# Patient Record
Sex: MALE
[De-identification: names, ages, dates, MRNs, and addresses within clinical notes are randomized per-mention and may not be internally consistent; named-entity substitution may affect disease eponyms.]

---

## 2017-03-29 PROBLEM — Z00.00 ENCOUNTER FOR PREVENTIVE HEALTH EXAMINATION: Status: ACTIVE | Noted: 2017-03-29

## 2017-03-31 ENCOUNTER — APPOINTMENT (OUTPATIENT)
Dept: ORTHOPEDIC SURGERY | Facility: CLINIC | Age: 27
End: 2017-03-31

## 2017-03-31 VITALS
DIASTOLIC BLOOD PRESSURE: 89 MMHG | HEART RATE: 55 BPM | WEIGHT: 150 LBS | BODY MASS INDEX: 20.32 KG/M2 | HEIGHT: 72 IN | SYSTOLIC BLOOD PRESSURE: 147 MMHG

## 2017-03-31 DIAGNOSIS — Z78.9 OTHER SPECIFIED HEALTH STATUS: ICD-10-CM

## 2017-03-31 DIAGNOSIS — S62.326A DISPLACED FRACTURE OF SHAFT OF FIFTH METACARPAL BONE, RIGHT HAND, INITIAL ENCOUNTER FOR CLOSED FRACTURE: ICD-10-CM

## 2017-05-01 ENCOUNTER — APPOINTMENT (OUTPATIENT)
Dept: ORTHOPEDIC SURGERY | Facility: CLINIC | Age: 27
End: 2017-05-01

## 2017-05-01 VITALS
HEART RATE: 58 BPM | HEIGHT: 72 IN | WEIGHT: 150 LBS | BODY MASS INDEX: 20.32 KG/M2 | SYSTOLIC BLOOD PRESSURE: 149 MMHG | DIASTOLIC BLOOD PRESSURE: 84 MMHG

## 2017-05-01 DIAGNOSIS — S62.327D DISPLACED FRACTURE OF SHAFT OF FIFTH METACARPAL BONE, LEFT HAND, SUBSEQUENT ENCOUNTER FOR FRACTURE WITH ROUTINE HEALING: ICD-10-CM

## 2023-09-20 ENCOUNTER — APPOINTMENT (OUTPATIENT)
Dept: ORTHOPEDIC SURGERY | Facility: CLINIC | Age: 33
End: 2023-09-20

## 2023-09-21 ENCOUNTER — APPOINTMENT (OUTPATIENT)
Dept: ORTHOPEDIC SURGERY | Facility: CLINIC | Age: 33
End: 2023-09-21
Payer: COMMERCIAL

## 2023-09-21 PROCEDURE — 99204 OFFICE O/P NEW MOD 45 MIN: CPT

## 2023-09-21 PROCEDURE — 73030 X-RAY EXAM OF SHOULDER: CPT | Mod: RT

## 2023-09-21 PROCEDURE — 73130 X-RAY EXAM OF HAND: CPT | Mod: RT

## 2023-09-21 RX ORDER — CELECOXIB 200 MG/1
200 CAPSULE ORAL TWICE DAILY
Qty: 60 | Refills: 2 | Status: ACTIVE | COMMUNITY
Start: 2023-09-21 | End: 1900-01-01

## 2023-09-21 RX ORDER — DICLOFENAC SODIUM 1% 10 MG/G
1 GEL TOPICAL DAILY
Qty: 1 | Refills: 3 | Status: ACTIVE | COMMUNITY
Start: 2023-09-21 | End: 1900-01-01

## 2023-10-12 ENCOUNTER — APPOINTMENT (OUTPATIENT)
Dept: ORTHOPEDIC SURGERY | Facility: CLINIC | Age: 33
End: 2023-10-12
Payer: COMMERCIAL

## 2023-10-12 VITALS — WEIGHT: 150 LBS | BODY MASS INDEX: 20.32 KG/M2 | HEIGHT: 72 IN

## 2023-10-12 PROCEDURE — 99214 OFFICE O/P EST MOD 30 MIN: CPT

## 2023-11-15 ENCOUNTER — APPOINTMENT (OUTPATIENT)
Dept: MRI IMAGING | Facility: CLINIC | Age: 33
End: 2023-11-15
Payer: COMMERCIAL

## 2023-11-15 ENCOUNTER — APPOINTMENT (OUTPATIENT)
Dept: ORTHOPEDIC SURGERY | Facility: CLINIC | Age: 33
End: 2023-11-15
Payer: COMMERCIAL

## 2023-11-15 VITALS — HEIGHT: 72 IN | BODY MASS INDEX: 20.32 KG/M2 | WEIGHT: 150 LBS

## 2023-11-15 PROCEDURE — 73221 MRI JOINT UPR EXTREM W/O DYE: CPT | Mod: RT

## 2023-11-15 PROCEDURE — 99214 OFFICE O/P EST MOD 30 MIN: CPT

## 2023-11-22 ENCOUNTER — APPOINTMENT (OUTPATIENT)
Dept: ORTHOPEDIC SURGERY | Facility: CLINIC | Age: 33
End: 2023-11-22
Payer: COMMERCIAL

## 2023-11-22 VITALS — WEIGHT: 150 LBS | HEIGHT: 72 IN | BODY MASS INDEX: 20.32 KG/M2

## 2023-11-22 PROCEDURE — 20610 DRAIN/INJ JOINT/BURSA W/O US: CPT | Mod: RT

## 2023-11-22 PROCEDURE — 99214 OFFICE O/P EST MOD 30 MIN: CPT | Mod: 25

## 2023-11-22 PROCEDURE — J3490M: CUSTOM

## 2023-12-27 ENCOUNTER — APPOINTMENT (OUTPATIENT)
Dept: ORTHOPEDIC SURGERY | Facility: CLINIC | Age: 33
End: 2023-12-27
Payer: COMMERCIAL

## 2023-12-27 DIAGNOSIS — S46.011A STRAIN OF MUSCLE(S) AND TENDON(S) OF THE ROTATOR CUFF OF RIGHT SHOULDER, INITIAL ENCOUNTER: ICD-10-CM

## 2023-12-27 DIAGNOSIS — S60.221A CONTUSION OF RIGHT HAND, INITIAL ENCOUNTER: ICD-10-CM

## 2023-12-27 PROCEDURE — 99213 OFFICE O/P EST LOW 20 MIN: CPT
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
## 2023-12-27 NOTE — IMAGING
[de-identified] : right hand with no skin changes/bony deformity. no longer with TTP over interosseous region between 1st and 2nd MC regions. There is no longer ttp over the 5th MC shaft with no palpable deformity. All digits are nvi with FAROM.   and intrinsic strength is 5/5. Right wrist with full and pain free ROM with 5/5 strength noted. There is no ttp over this region. Tomas, TFCC and Finkelstein Tests are negative. Carpal Tunnel Tinel Sign is negative.  Right shoulder with no skin changes/bony deformity. minimal TTP over lateral subacromial region. Posterior Lift Off, and Hanson Gerard Impingement signs are mildly positive. 5/5 strength in all planes with exception of Posterior Lift Off 5-/5. There is no ligamentous laxity noted. Chew Test is negative. Spurling Signs are negative.

## 2023-12-27 NOTE — ASSESSMENT
[FreeTextEntry1] : The patient was advised of the diagnosis. The natural history of the pathology was explained in full to the patient in layman's terms. All questions were answered. The risks and benefits of surgical and non-surgical treatment alternatives were explained in full to the patient.   pt will continue PT x 6 weeks F/u in 4 weeks with shoulder specialist if symptoms have not resolved.
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
## 2023-12-27 NOTE — DATA REVIEWED
[FreeTextEntry1] :  2. subscapularis tendinopathy and fraying with moderate grade insertional tear 3. fraying and tear of the superior labrum and anterior inferior labrum with biceps tendinopathy and tenosynovitis 4. capsular thickening more noted anterior which can be seen as adhesive capsulitis 5. mild narrowing of the glenohumeral joint with joint effusion

## 2024-01-25 ENCOUNTER — APPOINTMENT (OUTPATIENT)
Dept: ORTHOPEDIC SURGERY | Facility: CLINIC | Age: 34
End: 2024-01-25
Payer: COMMERCIAL

## 2024-01-25 VITALS — BODY MASS INDEX: 20.32 KG/M2 | HEIGHT: 72 IN | WEIGHT: 150 LBS

## 2024-01-25 PROCEDURE — 99214 OFFICE O/P EST MOD 30 MIN: CPT

## 2024-02-15 ENCOUNTER — APPOINTMENT (OUTPATIENT)
Dept: ORTHOPEDIC SURGERY | Facility: CLINIC | Age: 34
End: 2024-02-15
Payer: COMMERCIAL

## 2024-02-15 VITALS — BODY MASS INDEX: 20.32 KG/M2 | HEIGHT: 72 IN | WEIGHT: 150 LBS

## 2024-02-15 PROCEDURE — 99214 OFFICE O/P EST MOD 30 MIN: CPT | Mod: 57

## 2024-02-15 PROCEDURE — L1833: CPT | Mod: RT

## 2024-02-15 NOTE — IMAGING
[de-identified] : No swelling, no ecchymosis, no feng deformity Tenderness to palpation over anterior shoulder No instability or tenderness over AC joint Full range of motion with pain 5/5 supraspinatus, infraspinatus and subscapularis; there is pain with strength testing Positive Mota No anterior or posterior shift, no sulcus sign Negative apprehension Motor and sensory intact distally
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
## 2024-02-15 NOTE — ASSESSMENT
[FreeTextEntry1] : Reviewed MR arthrogram in detail.  As I suspected that he does have a SLAP tear.  In addition, there is also an incomplete rotator cuff tear.  He has failed 5 months conservative treatment.  At this time I have recommended arthroscopic SLAP repair possible rotator cuff repair as well.  Postoperative course outlined.  UltraSling dispensed today in preparation for surgery  The risks and benefits of surgery have been discussed. Risks include but are not limited to bleeding, infection, reaction to anesthesia, injury to blood vessels and nerves, malunion, nonunion, DVT, PE, necessity of repeat surgery, chronic pain, loss of limb and death. The patient understands the risks and agrees with the surgical plan. All questions have been answered.

## 2024-03-07 ENCOUNTER — TRANSCRIPTION ENCOUNTER (OUTPATIENT)
Age: 34
End: 2024-03-07

## 2024-04-02 ENCOUNTER — APPOINTMENT (OUTPATIENT)
Age: 34
End: 2024-04-02
Payer: COMMERCIAL

## 2024-04-02 PROCEDURE — 29807 SHO ARTHRS SRG RPR SLAP LES: CPT | Mod: RT

## 2024-04-02 PROCEDURE — 29823 SHO ARTHRS SRG XTNSV DBRDMT: CPT | Mod: 59,RT

## 2024-04-02 PROCEDURE — 29823 SHO ARTHRS SRG XTNSV DBRDMT: CPT | Mod: AS,59,RT

## 2024-04-02 PROCEDURE — 29807 SHO ARTHRS SRG RPR SLAP LES: CPT | Mod: AS,RT

## 2024-04-02 RX ORDER — OXYCODONE AND ACETAMINOPHEN 5; 325 MG/1; MG/1
5-325 TABLET ORAL
Qty: 30 | Refills: 0 | Status: ACTIVE | COMMUNITY
Start: 2024-04-02 | End: 1900-01-01

## 2024-04-12 ENCOUNTER — APPOINTMENT (OUTPATIENT)
Dept: ORTHOPEDIC SURGERY | Facility: CLINIC | Age: 34
End: 2024-04-12
Payer: COMMERCIAL

## 2024-04-12 VITALS — WEIGHT: 150 LBS | BODY MASS INDEX: 20.32 KG/M2 | HEIGHT: 72 IN

## 2024-04-12 PROCEDURE — 99024 POSTOP FOLLOW-UP VISIT: CPT
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
## 2024-04-12 NOTE — IMAGING
[de-identified] : No erythema or warmth Clean and dry incisions, sutures in place Shoulder immobilizer in place Limited ROM compatible with recent surgery Motor and sensory intact distally

## 2024-04-12 NOTE — ASSESSMENT
[FreeTextEntry1] : DOING WELL DURING THIS FIRST POSTOP VISIT SUTURES REMOVED PRECAUTIONS AND RESTRICTIONS REVIEWED IN DETAIL WE REVIEWED THE INTRAOPERATIVE FINDINGS IN DETAIL (INCLUDING SCOPE PICTURES WHERE APPLICABLE) ALL QUESTIONS ANSWERED START PT

## 2024-05-10 ENCOUNTER — APPOINTMENT (OUTPATIENT)
Dept: ORTHOPEDIC SURGERY | Facility: CLINIC | Age: 34
End: 2024-05-10
Payer: COMMERCIAL

## 2024-05-10 VITALS — HEIGHT: 72 IN | WEIGHT: 150 LBS | BODY MASS INDEX: 20.32 KG/M2

## 2024-05-10 PROCEDURE — 99024 POSTOP FOLLOW-UP VISIT: CPT

## 2024-05-10 NOTE — ASSESSMENT
[FreeTextEntry1] : Progressing well after SLAP repair. Additional PT advised. Precautions and restrictions reviewed.  Follow-up in 6 weeks to reassess

## 2024-05-10 NOTE — IMAGING
[de-identified] : Portals healed Forward flexion 170 external rotation 80 internal rotation 60 Neurovascular intact distally
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
## 2024-06-21 ENCOUNTER — APPOINTMENT (OUTPATIENT)
Dept: ORTHOPEDIC SURGERY | Facility: CLINIC | Age: 34
End: 2024-06-21
Payer: SELF-PAY

## 2024-06-21 DIAGNOSIS — M75.111 INCOMPLETE ROTATOR CUFF TEAR OR RUPTURE OF RIGHT SHOULDER, NOT SPECIFIED AS TRAUMATIC: ICD-10-CM

## 2024-06-21 DIAGNOSIS — S43.431D SUPERIOR GLENOID LABRUM LESION OF RIGHT SHOULDER, SUBSEQUENT ENCOUNTER: ICD-10-CM

## 2024-06-21 DIAGNOSIS — M75.01 ADHESIVE CAPSULITIS OF RIGHT SHOULDER: ICD-10-CM

## 2024-06-21 PROCEDURE — 99024 POSTOP FOLLOW-UP VISIT: CPT

## 2024-06-21 NOTE — IMAGING
[de-identified] : Portals healed  Forward flexion 180 external rotation 90 internal rotation 65 5/5 Ss and IS Neurovascular intact distally

## 2024-06-21 NOTE — ASSESSMENT
[FreeTextEntry1] : Progressing well after SLAP repair.,  Now 2-1/2 months post. Patient doing well with PT - instructed that he can decrease PT from 3x/week to 1-2x/week - PT renewal script sent  Patient can return to work duty as tolerated.  Follow-up in 6 weeks for final follow-up
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
## 2024-08-15 ENCOUNTER — APPOINTMENT (OUTPATIENT)
Dept: ORTHOPEDIC SURGERY | Facility: CLINIC | Age: 34
End: 2024-08-15
Payer: SELF-PAY

## 2024-08-15 DIAGNOSIS — S43.431D SUPERIOR GLENOID LABRUM LESION OF RIGHT SHOULDER, SUBSEQUENT ENCOUNTER: ICD-10-CM

## 2024-08-15 PROCEDURE — 99213 OFFICE O/P EST LOW 20 MIN: CPT
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
## 2024-08-15 NOTE — IMAGING
[de-identified] : Shoulder Exam Inspection: No swelling, no ecchymosis, no feng deformity Palpation: Tenderness to palpation over greater tuberosity Stability: No instability or tenderness over AC joint Range of Motion: Active Forward Flexion 180 Passive FF: 180; ER: 90: IR: 70; ER at the side 50 Strength: 5/5 supraspinatus, infraspinatus and subscapularis; there is pain with strength testing Special testing: Positive Hanson test, positive impingement sign; Speeds and yergason negative Neuro: Motor and sensory intact distally

## 2024-08-15 NOTE — ASSESSMENT
[FreeTextEntry1] : He is now 4 months from SLAP repair doing well.  Continued physical therapy advised.  Transition to home exercise program and he will contact me if any new issues arise.